# Patient Record
Sex: FEMALE | Race: WHITE
[De-identification: names, ages, dates, MRNs, and addresses within clinical notes are randomized per-mention and may not be internally consistent; named-entity substitution may affect disease eponyms.]

---

## 2017-10-06 ENCOUNTER — HOSPITAL ENCOUNTER (EMERGENCY)
Dept: HOSPITAL 4 - SED | Age: 67
Discharge: HOME | End: 2017-10-06
Payer: COMMERCIAL

## 2017-10-06 VITALS — BODY MASS INDEX: 21.16 KG/M2 | SYSTOLIC BLOOD PRESSURE: 147 MMHG | HEIGHT: 62 IN | WEIGHT: 115 LBS

## 2017-10-06 VITALS — SYSTOLIC BLOOD PRESSURE: 147 MMHG

## 2017-10-06 DIAGNOSIS — H61.21: Primary | ICD-10-CM

## 2017-10-06 DIAGNOSIS — Z88.1: ICD-10-CM

## 2018-08-17 ENCOUNTER — HOSPITAL ENCOUNTER (EMERGENCY)
Dept: HOSPITAL 4 - SED | Age: 68
Discharge: HOME | End: 2018-08-17
Payer: COMMERCIAL

## 2018-08-17 VITALS — HEIGHT: 62 IN | WEIGHT: 117 LBS | BODY MASS INDEX: 21.53 KG/M2

## 2018-08-17 VITALS — SYSTOLIC BLOOD PRESSURE: 121 MMHG

## 2018-08-17 VITALS — SYSTOLIC BLOOD PRESSURE: 118 MMHG

## 2018-08-17 DIAGNOSIS — M54.9: ICD-10-CM

## 2018-08-17 DIAGNOSIS — Z88.8: ICD-10-CM

## 2018-08-17 DIAGNOSIS — R07.89: Primary | ICD-10-CM

## 2018-08-17 DIAGNOSIS — Z91.013: ICD-10-CM

## 2018-08-17 PROCEDURE — 71100 X-RAY EXAM RIBS UNI 2 VIEWS: CPT

## 2018-08-17 PROCEDURE — 99284 EMERGENCY DEPT VISIT MOD MDM: CPT

## 2018-08-17 PROCEDURE — 96372 THER/PROPH/DIAG INJ SC/IM: CPT

## 2018-08-17 PROCEDURE — 93005 ELECTROCARDIOGRAM TRACING: CPT

## 2018-08-17 NOTE — NUR
Patient VSS. Patient verbalizes that she wants to go home and waited long 
enough to be seen. Pt made aware that MD will be preparing discharge 
instructions. Pt agreed to wait. MD and Charge nurse made aware.

## 2018-08-17 NOTE — NUR
Patient presented to ED c/o R lateral Rib pain radiating to her back, 10/10. 
Patient stated she accidentally hit her rib with her elbow while doing 
housework last week. Patient denies fever, N&V. No SOB or acute distress noted. 
Family at bedside. Patient A/O x 4. Will continue to monitor.

## 2018-08-17 NOTE — NUR
Patient made aware of CT scan ordered, pt refused the procedure and verbalized 
she'll come back catie to ED as she lives across the street. Pt verbalized she's 
ready to go home. MD made aware. MARILIA made aware.